# Patient Record
Sex: FEMALE | Race: WHITE | Employment: FULL TIME | URBAN - METROPOLITAN AREA
[De-identification: names, ages, dates, MRNs, and addresses within clinical notes are randomized per-mention and may not be internally consistent; named-entity substitution may affect disease eponyms.]

---

## 2023-09-21 ENCOUNTER — ANESTHESIA EVENT (OUTPATIENT)
Dept: LABOR AND DELIVERY | Age: 28
End: 2023-09-21

## 2023-09-22 ENCOUNTER — ANESTHESIA (OUTPATIENT)
Dept: LABOR AND DELIVERY | Age: 28
End: 2023-09-22

## 2023-09-22 ENCOUNTER — HOSPITAL ENCOUNTER (OUTPATIENT)
Age: 28
Discharge: HOME | End: 2023-09-22
Attending: OBSTETRICS & GYNECOLOGY | Admitting: OBSTETRICS & GYNECOLOGY

## 2023-09-22 VITALS
DIASTOLIC BLOOD PRESSURE: 54 MMHG | HEIGHT: 67 IN | RESPIRATION RATE: 16 BRPM | SYSTOLIC BLOOD PRESSURE: 97 MMHG | BODY MASS INDEX: 25.11 KG/M2 | HEART RATE: 58 BPM | WEIGHT: 160 LBS | OXYGEN SATURATION: 100 % | TEMPERATURE: 98 F

## 2023-09-22 LAB
ABO + RH BLD: NORMAL
BLD GP AB SCN SERPL QL: NORMAL
DEPRECATED RDW RBC AUTO: 13.1 % (ref 12.4–15.4)
HCT VFR BLD AUTO: 28.2 % (ref 36–48)
HCT VFR BLD AUTO: 33.6 % (ref 36–48)
HGB BLD-MCNC: 11.8 G/DL (ref 12–16)
HGB BLD-MCNC: 9.6 G/DL (ref 12–16)
MCH RBC QN AUTO: 31.6 PG (ref 26–34)
MCHC RBC AUTO-ENTMCNC: 35 G/DL (ref 31–36)
MCV RBC AUTO: 90.2 FL (ref 80–100)
PLATELET # BLD AUTO: 320 K/UL (ref 135–450)
PMV BLD AUTO: 8.4 FL (ref 5–10.5)
RBC # BLD AUTO: 3.73 M/UL (ref 4–5.2)
WBC # BLD AUTO: 6.2 K/UL (ref 4–11)

## 2023-09-22 PROCEDURE — 6360000002 HC RX W HCPCS: Performed by: OBSTETRICS & GYNECOLOGY

## 2023-09-22 PROCEDURE — 85027 COMPLETE CBC AUTOMATED: CPT

## 2023-09-22 PROCEDURE — 96375 TX/PRO/DX INJ NEW DRUG ADDON: CPT

## 2023-09-22 PROCEDURE — 3700000000 HC ANESTHESIA ATTENDED CARE: Performed by: OBSTETRICS & GYNECOLOGY

## 2023-09-22 PROCEDURE — 3600000002 HC SURGERY LEVEL 2 BASE: Performed by: OBSTETRICS & GYNECOLOGY

## 2023-09-22 PROCEDURE — 2709999900 HC NON-CHARGEABLE SUPPLY: Performed by: OBSTETRICS & GYNECOLOGY

## 2023-09-22 PROCEDURE — 2580000003 HC RX 258: Performed by: NURSE ANESTHETIST, CERTIFIED REGISTERED

## 2023-09-22 PROCEDURE — 2500000003 HC RX 250 WO HCPCS

## 2023-09-22 PROCEDURE — 86850 RBC ANTIBODY SCREEN: CPT

## 2023-09-22 PROCEDURE — 2500000003 HC RX 250 WO HCPCS: Performed by: NURSE ANESTHETIST, CERTIFIED REGISTERED

## 2023-09-22 PROCEDURE — 96360 HYDRATION IV INFUSION INIT: CPT

## 2023-09-22 PROCEDURE — 86901 BLOOD TYPING SEROLOGIC RH(D): CPT

## 2023-09-22 PROCEDURE — 88305 TISSUE EXAM BY PATHOLOGIST: CPT

## 2023-09-22 PROCEDURE — 86900 BLOOD TYPING SEROLOGIC ABO: CPT

## 2023-09-22 PROCEDURE — 85014 HEMATOCRIT: CPT

## 2023-09-22 PROCEDURE — 2580000003 HC RX 258: Performed by: OBSTETRICS & GYNECOLOGY

## 2023-09-22 PROCEDURE — 3700000001 HC ADD 15 MINUTES (ANESTHESIA): Performed by: OBSTETRICS & GYNECOLOGY

## 2023-09-22 PROCEDURE — 7100000000 HC PACU RECOVERY - FIRST 15 MIN: Performed by: OBSTETRICS & GYNECOLOGY

## 2023-09-22 PROCEDURE — 7100000001 HC PACU RECOVERY - ADDTL 15 MIN: Performed by: OBSTETRICS & GYNECOLOGY

## 2023-09-22 PROCEDURE — 3600000012 HC SURGERY LEVEL 2 ADDTL 15MIN: Performed by: OBSTETRICS & GYNECOLOGY

## 2023-09-22 PROCEDURE — 6360000002 HC RX W HCPCS

## 2023-09-22 PROCEDURE — 96374 THER/PROPH/DIAG INJ IV PUSH: CPT

## 2023-09-22 PROCEDURE — 2500000003 HC RX 250 WO HCPCS: Performed by: OBSTETRICS & GYNECOLOGY

## 2023-09-22 PROCEDURE — 6360000002 HC RX W HCPCS: Performed by: NURSE ANESTHETIST, CERTIFIED REGISTERED

## 2023-09-22 PROCEDURE — 96361 HYDRATE IV INFUSION ADD-ON: CPT

## 2023-09-22 PROCEDURE — 51701 INSERT BLADDER CATHETER: CPT

## 2023-09-22 PROCEDURE — 85018 HEMOGLOBIN: CPT

## 2023-09-22 RX ORDER — FENTANYL CITRATE 50 UG/ML
INJECTION, SOLUTION INTRAMUSCULAR; INTRAVENOUS PRN
Status: DISCONTINUED | OUTPATIENT
Start: 2023-09-22 | End: 2023-09-22 | Stop reason: SDUPTHER

## 2023-09-22 RX ORDER — FAMOTIDINE 10 MG/ML
INJECTION, SOLUTION INTRAVENOUS
Status: COMPLETED
Start: 2023-09-22 | End: 2023-09-22

## 2023-09-22 RX ORDER — SODIUM CHLORIDE, SODIUM LACTATE, POTASSIUM CHLORIDE, CALCIUM CHLORIDE 600; 310; 30; 20 MG/100ML; MG/100ML; MG/100ML; MG/100ML
INJECTION, SOLUTION INTRAVENOUS CONTINUOUS
Status: DISCONTINUED | OUTPATIENT
Start: 2023-09-22 | End: 2023-09-22 | Stop reason: HOSPADM

## 2023-09-22 RX ORDER — DEXAMETHASONE SODIUM PHOSPHATE 4 MG/ML
INJECTION, SOLUTION INTRA-ARTICULAR; INTRALESIONAL; INTRAMUSCULAR; INTRAVENOUS; SOFT TISSUE PRN
Status: DISCONTINUED | OUTPATIENT
Start: 2023-09-22 | End: 2023-09-22 | Stop reason: SDUPTHER

## 2023-09-22 RX ORDER — METOCLOPRAMIDE HYDROCHLORIDE 5 MG/ML
INJECTION INTRAMUSCULAR; INTRAVENOUS
Status: COMPLETED
Start: 2023-09-22 | End: 2023-09-22

## 2023-09-22 RX ORDER — METOCLOPRAMIDE HYDROCHLORIDE 5 MG/ML
10 INJECTION INTRAMUSCULAR; INTRAVENOUS ONCE
Status: COMPLETED | OUTPATIENT
Start: 2023-09-22 | End: 2023-09-22

## 2023-09-22 RX ORDER — METHYLERGONOVINE MALEATE 0.2 MG/ML
200 INJECTION INTRAVENOUS ONCE
Status: COMPLETED | OUTPATIENT
Start: 2023-09-22 | End: 2023-09-22

## 2023-09-22 RX ORDER — PROPOFOL 10 MG/ML
INJECTION, EMULSION INTRAVENOUS PRN
Status: DISCONTINUED | OUTPATIENT
Start: 2023-09-22 | End: 2023-09-22 | Stop reason: SDUPTHER

## 2023-09-22 RX ORDER — KETOROLAC TROMETHAMINE 30 MG/ML
INJECTION, SOLUTION INTRAMUSCULAR; INTRAVENOUS PRN
Status: DISCONTINUED | OUTPATIENT
Start: 2023-09-22 | End: 2023-09-22 | Stop reason: SDUPTHER

## 2023-09-22 RX ORDER — SODIUM CHLORIDE, SODIUM LACTATE, POTASSIUM CHLORIDE, CALCIUM CHLORIDE 600; 310; 30; 20 MG/100ML; MG/100ML; MG/100ML; MG/100ML
INJECTION, SOLUTION INTRAVENOUS CONTINUOUS PRN
Status: DISCONTINUED | OUTPATIENT
Start: 2023-09-22 | End: 2023-09-22 | Stop reason: SDUPTHER

## 2023-09-22 RX ORDER — ONDANSETRON 2 MG/ML
INJECTION INTRAMUSCULAR; INTRAVENOUS PRN
Status: DISCONTINUED | OUTPATIENT
Start: 2023-09-22 | End: 2023-09-22 | Stop reason: SDUPTHER

## 2023-09-22 RX ORDER — MIDAZOLAM HYDROCHLORIDE 1 MG/ML
INJECTION INTRAMUSCULAR; INTRAVENOUS PRN
Status: DISCONTINUED | OUTPATIENT
Start: 2023-09-22 | End: 2023-09-22 | Stop reason: SDUPTHER

## 2023-09-22 RX ORDER — LIDOCAINE HYDROCHLORIDE 20 MG/ML
INJECTION, SOLUTION INFILTRATION; PERINEURAL PRN
Status: DISCONTINUED | OUTPATIENT
Start: 2023-09-22 | End: 2023-09-22 | Stop reason: SDUPTHER

## 2023-09-22 RX ADMIN — FENTANYL CITRATE 50 MCG: 50 INJECTION INTRAMUSCULAR; INTRAVENOUS at 11:15

## 2023-09-22 RX ADMIN — PROPOFOL 150 MG: 10 INJECTION, EMULSION INTRAVENOUS at 11:15

## 2023-09-22 RX ADMIN — DOXYCYCLINE 200 MG: 100 INJECTION, POWDER, LYOPHILIZED, FOR SOLUTION INTRAVENOUS at 11:08

## 2023-09-22 RX ADMIN — SODIUM CHLORIDE, POTASSIUM CHLORIDE, SODIUM LACTATE AND CALCIUM CHLORIDE: 600; 310; 30; 20 INJECTION, SOLUTION INTRAVENOUS at 09:32

## 2023-09-22 RX ADMIN — FAMOTIDINE 20 MG: 10 INJECTION, SOLUTION INTRAVENOUS at 10:27

## 2023-09-22 RX ADMIN — METHYLERGONOVINE MALEATE 200 MCG: 0.2 INJECTION, SOLUTION INTRAMUSCULAR; INTRAVENOUS at 11:43

## 2023-09-22 RX ADMIN — KETOROLAC TROMETHAMINE 30 MG: 30 INJECTION, SOLUTION INTRAMUSCULAR; INTRAVENOUS at 11:19

## 2023-09-22 RX ADMIN — SODIUM CHLORIDE, SODIUM LACTATE, POTASSIUM CHLORIDE, AND CALCIUM CHLORIDE: .6; .31; .03; .02 INJECTION, SOLUTION INTRAVENOUS at 11:45

## 2023-09-22 RX ADMIN — LIDOCAINE HYDROCHLORIDE 100 MG: 20 INJECTION, SOLUTION INFILTRATION; PERINEURAL at 11:15

## 2023-09-22 RX ADMIN — MIDAZOLAM 1 MG: 1 INJECTION INTRAMUSCULAR; INTRAVENOUS at 11:08

## 2023-09-22 RX ADMIN — ONDANSETRON 4 MG: 2 INJECTION INTRAMUSCULAR; INTRAVENOUS at 11:19

## 2023-09-22 RX ADMIN — SODIUM CHLORIDE, SODIUM LACTATE, POTASSIUM CHLORIDE, AND CALCIUM CHLORIDE: .6; .31; .03; .02 INJECTION, SOLUTION INTRAVENOUS at 11:08

## 2023-09-22 RX ADMIN — METOCLOPRAMIDE HYDROCHLORIDE 10 MG: 5 INJECTION INTRAMUSCULAR; INTRAVENOUS at 10:27

## 2023-09-22 RX ADMIN — FENTANYL CITRATE 50 MCG: 50 INJECTION INTRAMUSCULAR; INTRAVENOUS at 11:08

## 2023-09-22 RX ADMIN — MIDAZOLAM 1 MG: 1 INJECTION INTRAMUSCULAR; INTRAVENOUS at 11:15

## 2023-09-22 RX ADMIN — DEXAMETHASONE SODIUM PHOSPHATE 8 MG: 4 INJECTION, SOLUTION INTRAMUSCULAR; INTRAVENOUS at 11:19

## 2023-09-22 NOTE — H&P
Department of Obstetrics and Gynecology   Obstetrics History and Physical        CHIEF COMPLAINT:  MAB    HISTORY OF PRESENT ILLNESS:    Jesica Galvan  is a 34 y.o.  female at 12w5d presents with a chief complaint as above and is being admitted for surgical management of MAB. PAST OB HISTORY:  OB History          1    Para        Term                AB        Living             SAB        IAB        Ectopic        Molar        Multiple        Live Births                  Past Medical History:    No past medical history on file.   Past Surgical History:        Procedure Laterality Date    WISDOM TOOTH EXTRACTION Bilateral      Allergies:  Adhesive tape  Social History:    Social History     Socioeconomic History    Marital status:      Spouse name: Not on file    Number of children: Not on file    Years of education: Not on file    Highest education level: Not on file   Occupational History    Not on file   Tobacco Use    Smoking status: Former     Types: Cigarettes     Quit date:      Years since quittin.7    Smokeless tobacco: Never   Vaping Use    Vaping Use: Never used   Substance and Sexual Activity    Alcohol use: Yes     Comment: socially    Drug use: Never    Sexual activity: Yes     Partners: Male   Other Topics Concern    Not on file   Social History Narrative    Not on file     Social Determinants of Health     Financial Resource Strain: Not on file   Food Insecurity: Not on file   Transportation Needs: Not on file   Physical Activity: Not on file   Stress: Not on file   Social Connections: Not on file   Intimate Partner Violence: Not on file   Housing Stability: Not on file     Family History:       Problem Relation Age of Onset    No Known Problems Mother     Unknown Father     No Known Problems Sister     No Known Problems Sister     No Known Problems Brother      Medications Prior to Admission:  Medications Prior to Admission: Prenatal MV-Min-Fe Fum-FA-DHA

## 2023-09-22 NOTE — FLOWSHEET NOTE
IV d/c'd for discharge. Discharge instructions given and reviewed. All questions answered. Discharged via wheelchair to waiting vehicle. Home with  in stable condition.

## 2023-09-22 NOTE — FLOWSHEET NOTE
Dr. Luis Carranza aware of H&H results, okay to give crackers and drink to patient. Soda and saltines provided.

## 2023-09-22 NOTE — OP NOTE
OPERATIVE REPORT    Date of surgery: 23   Pre-operative Diagnosis: Missed AB  Post-operative Diagnosis: the same  Procedure: Dilatation and Curretage  Anesthesia: General  Surgeon: Dr. Kyara Shook  Assistant: DAISY Pineda  Findings: 10wk sized anteverted uterus with large amount of products of conception  Complications: none  Specimens: Products of conception  Urine Output: 100ml mL  Estimated blood loss: 1300 mL    Indications: Patient is Jose Juan Hudson  is a 34 y.o.  female found to have [address] @ 11w5d measuring 10wks on US. The risks of the procedure were reviewed with the patient in details . They included but were not limited to risk of uterine perforation, that might require additional procedure, risk of bleeding and blood transfusion which carries risk for HIV or hepatitis, allergic reaction, risk of infection, risk of adhesive disease or scar formation in the uterus with subsequent infertility, risk of blood clot/embolism, risk of anesthesia, cardiac arrest, and remote risk of maternal death. Patient accepted the risks and elected to proceed with procedure. Description of Procedure: The patient was taken to the Operating Room where anesthesia was found to be adequate. Exam under anesthesia revealed anteverted uterus enlarged to 10 weeks. The patient was then placed in the dorsal lithotomy position and prepped and draped in the usual sterile fashion. A red rubber catheter was used to drain the urine from the bladder. Time out was performed, identifying correct patients name, date of birth, and type of the procedure. The cervix and distal vagina were visualized. The cervix was dilated on exam approximately to 1 cm. Tenaculum was used to grasp the anterior lip of the cervix. Suction currett 11 mm size was passed several times gently, removing Large amount of tissue. Polyp forceps were also used to remove a large piece of tissue during the procedure.  Following that sharp mike was

## 2023-09-22 NOTE — FLOWSHEET NOTE
Patient admitted to triage B for scheduled D&C. She states she is 10 weeks of gestation. Her  is at her bedside. Procedure consent, disposition form, and anesthesia consent forms discussed, patient given time to read and discuss with . All questions answered. Consents signed and placed on chart. IV placed, labs drawn and sent. Oriented to room, plan of care, and call light.

## 2023-09-22 NOTE — DISCHARGE INSTRUCTIONS
Follow up with your [facility] Provider as directed      At this time you may feel overwhelmed. Please know that there are resources available to help you through these difficult days. For a referral to a support group or to answer other questions you may call the Bereavement Nurse, Raven Lynch [phone] or  the [facility] Care Department at [phone]. General Discharge Instructions      DIET  Eat a well balanced diet focusing on foods high in fiber and protein. Drink plenty of fluids especially water. To avoid constipation you may take a mild stool softener as recommended by your doctor or midwife. ACTIVITY  Gradually increase your activity. Resume exercise regimen only after advised by your doctor or midwife. Avoid lifting anything heavier than a gallon of milk for SIX weeks. Avoid driving for two weeks or if taking Narcotics. Rise slowly from a lying to sitting and then a standing position. Climb stairs one at a time. NO SEXUAL Activity for 4-6 weeks or until advised by your doctor; nothing in vagina: intercourse, tampons, or douching. You may feel tired or have a lack of energy. You may continue your prenatal vitamin to replenish nutrients post delivery. EMOTIONS  You may feel rodriguez, sad & teary. Contact your OB provider if you feel you may be showing signs of postpartum depression. BLEEDING  Vaginal bleeding will decrease in amount over the next few weeks. You will notice that as your activity increases, your flow may increase. This is your body's way of telling you, you need take things easier and rest more often. Call your OB provider if you are saturating more than one maxi pad in an hour & resting does not help, if your bleeding has a foul odor or you are passing clots larger than a lemon. BREAST CARE  Take medications as recommended by your doctor or midwife for pain  If you develop a warm, red, tender area on your breast or develop a fever contact your OB provider.    You

## 2023-09-22 NOTE — FLOWSHEET NOTE
Myself, [address], [address], and Angela morales CRNA have attempted to draw blood for the STAT H&H but have been unsuccessful. Called phlebotomy and she will come draw it for us.

## 2023-09-22 NOTE — ANESTHESIA POSTPROCEDURE EVALUATION
Department of Anesthesiology  Postprocedure Note    Patient: Sourav January  MRN: [id number]  YOB: 1995  Date of evaluation: 9/22/2023      Procedure Summary     Date: 09/22/23 Room / Location: [facility] OR [address] [place] [address]    Anesthesia Start: 1108 Anesthesia Stop: 1159    Procedure: DILATATION AND CURETTAGE SUCTION (Vagina ) Diagnosis:       S/P D&C (status post dilation and curettage)      (S/P D&C (status post dilation and curettage) [S96.755])    Surgeons: Elidia Alberto MD Responsible Provider: Michael Campbell MD    Anesthesia Type: general ASA Status: 2          Anesthesia Type: General     Olamide Phase I: Olamide Score: 10    Olamide Phase II:        Anesthesia Post Evaluation    Patient location during evaluation: PACU  Patient participation: complete - patient participated  Level of consciousness: responsive to verbal stimuli and responsive to physical stimuli  Pain score: 2  Airway patency: patent  Nausea & Vomiting: no vomiting and no nausea  Complications: no  Cardiovascular status: hemodynamically stable  Respiratory status: spontaneous ventilation and acceptable  Hydration status: stable  Pain management: satisfactory to patient

## 2023-09-22 NOTE — FLOWSHEET NOTE
Patient has tolerated 8oz PO fluids and crackers with no n/v. Up to bathroom with contact guard assist, steady gait noted, able to void easily. She is dressing with her husbands help.

## 2024-04-23 LAB
C. TRACHOMATIS, EXTERNAL RESULT: NORMAL
N. GONORRHOEAE, EXTERNAL RESULT: NORMAL

## 2024-05-20 LAB
ABO, EXTERNAL RESULT: NORMAL
HEP B, EXTERNAL RESULT: NORMAL
HEPATITIS C ANTIBODY, EXTERNAL RESULT: NORMAL
HIV, EXTERNAL RESULT: NONREACTIVE
RH FACTOR, EXTERNAL RESULT: NORMAL
RUBELLA TITER, EXTERNAL RESULT: NORMAL

## 2024-08-20 LAB — RPR, EXTERNAL RESULT: NON REACTIVE

## 2024-09-29 ENCOUNTER — HOSPITAL ENCOUNTER (OUTPATIENT)
Age: 29
Discharge: HOME | End: 2024-09-29
Attending: OBSTETRICS & GYNECOLOGY | Admitting: OBSTETRICS & GYNECOLOGY
Payer: COMMERCIAL

## 2024-09-29 VITALS
DIASTOLIC BLOOD PRESSURE: 68 MMHG | RESPIRATION RATE: 18 BRPM | OXYGEN SATURATION: 97 % | HEART RATE: 86 BPM | WEIGHT: 180 LBS | TEMPERATURE: 98.3 F | HEIGHT: 67 IN | SYSTOLIC BLOOD PRESSURE: 117 MMHG | BODY MASS INDEX: 28.25 KG/M2

## 2024-09-29 PROBLEM — O46.90 VAGINAL BLEEDING DURING PREGNANCY: Status: ACTIVE | Noted: 2024-09-29

## 2024-09-29 PROCEDURE — 99202 OFFICE O/P NEW SF 15 MIN: CPT

## 2024-09-29 NOTE — FLOWSHEET NOTE
Discharge instructions reviewed with pt by LEWIS Moralez RN.  Pt verbalizes understanding.  Discharged home, ambulatory and undelivered.

## 2024-09-29 NOTE — FLOWSHEET NOTE
Pt presents to triage with c/o sudden onset vaginal bleeding and lower abdominal cramping after intercourse this AM at 0515.  Pt reports \"mostly full panty liner\" after 45 minutes and denies cramping/jairon at present.  Uterus palpates soft.  Pt verbalizes normal fetal movement.  Dr. Pichardo requesting SSE by Dr. Serna with update after.  POC discussed with pt and all questions answered.  Dr. Serna called with update on pt status.  MD to evaluate at bedside.

## 2024-09-29 NOTE — DISCHARGE INSTRUCTIONS
movement, persistent low back pain or cramping, bleeding from vaginal area, difficulty urinating, pain with urination, difficulty breathing, new calf pain, persistent headache, or vision change        Fetal Movement Chart    A daily diary of your baby's movements provides useful information.  Please bee down anytime the baby moves during at least one convenient hour each day.  Pick an hour when the baby is usually active.  It is also important that you have a regular meal within two hours. Lay on your left side, in this position the circulation to the baby is improved, and count the number of movements.  If the baby moves less than 5-6 times in an hour or 10 times in 2 hours, or you are concerned about you or your baby call your doctor or midwife.        Date Time Counts Total Date Time Counts Total

## 2024-09-29 NOTE — FLOWSHEET NOTE
Dr. Serna to pt bedside.  Assessment completed and SSE performed.  Pt tolerated well.  No active bleeding noted and cervix closed/thick/high with SVE.  FHR tracing viewed and POC discussed including recommendation for discharge home.  All pt questions answered.  Dr. Pichardo called with update on pt status.  Orders confirmed for discharge home at this time.

## 2024-09-29 NOTE — PROGRESS NOTES
House OB    30 yo W with IUP at 31.6 weeks pt of Dr. Pichardo. Presents to L&D this AM after SIC 5 AM today caused onset of vaginal spotting and some ctxs. Reports BPNC up til now. Next OB appt tomorrow. No fever or chills. No n/v. Reports great fetal movement    AfVss  WF in NAD, pleasant mood. A&Ox3.  Lungs: CTA  Heart: RRR w/o m/g/r  Abd: soft. Nontender. Uterus nontender. No HSM. No hernia, No CVAT  Ext: no c/c/e  Spec exam: cervix closed/thick/hi minimal/trace blood tinged d/c. No bleeding from os  Vtx on VE  -150s with good variability for gest. Age    A/P  IUP at 31.6 weeks   No si/o active bleeding after SIC   Pelvic rest instructions until cleared at next OB visit   `Kick counts and bleeding precautions reviewed   RN will notify attending physician fur further instructions.

## 2024-10-31 LAB — GBS, EXTERNAL RESULT: NEGATIVE

## 2024-11-20 ENCOUNTER — HOSPITAL ENCOUNTER (INPATIENT)
Age: 29
LOS: 1 days | Discharge: HOME | End: 2024-11-21
Attending: OBSTETRICS & GYNECOLOGY | Admitting: OBSTETRICS & GYNECOLOGY
Payer: COMMERCIAL

## 2024-11-20 LAB
ABO + RH BLD: NORMAL
AMPHETAMINES UR QL SCN>1000 NG/ML: ABNORMAL
BARBITURATES UR QL SCN>200 NG/ML: ABNORMAL
BENZODIAZ UR QL SCN>200 NG/ML: ABNORMAL
BLD GP AB SCN SERPL QL: NORMAL
BUPRENORPHINE+NOR UR QL SCN: ABNORMAL
CANNABINOIDS UR QL SCN>50 NG/ML: POSITIVE
COCAINE UR QL SCN: ABNORMAL
DEPRECATED RDW RBC AUTO: 12.6 % (ref 12.4–15.4)
DRUG SCREEN COMMENT UR-IMP: ABNORMAL
FENTANYL SCREEN, URINE: ABNORMAL
HCT VFR BLD AUTO: 37.6 % (ref 36–48)
HGB BLD-MCNC: 13 G/DL (ref 12–16)
MCH RBC QN AUTO: 31.7 PG (ref 26–34)
MCHC RBC AUTO-ENTMCNC: 34.6 G/DL (ref 31–36)
MCV RBC AUTO: 91.8 FL (ref 80–100)
METHADONE UR QL SCN>300 NG/ML: ABNORMAL
OPIATES UR QL SCN>300 NG/ML: ABNORMAL
OXYCODONE UR QL SCN: ABNORMAL
PCP UR QL SCN>25 NG/ML: ABNORMAL
PH UR STRIP: 8 [PH]
PLATELET # BLD AUTO: 355 K/UL (ref 135–450)
PMV BLD AUTO: 8.4 FL (ref 5–10.5)
RBC # BLD AUTO: 4.1 M/UL (ref 4–5.2)
REAGIN+T PALLIDUM IGG+IGM SERPL-IMP: NORMAL
WBC # BLD AUTO: 13.7 K/UL (ref 4–11)

## 2024-11-20 PROCEDURE — 86850 RBC ANTIBODY SCREEN: CPT

## 2024-11-20 PROCEDURE — 86900 BLOOD TYPING SEROLOGIC ABO: CPT

## 2024-11-20 PROCEDURE — 86780 TREPONEMA PALLIDUM: CPT

## 2024-11-20 PROCEDURE — 85027 COMPLETE CBC AUTOMATED: CPT

## 2024-11-20 PROCEDURE — 86901 BLOOD TYPING SEROLOGIC RH(D): CPT

## 2024-11-20 PROCEDURE — 80307 DRUG TEST PRSMV CHEM ANLYZR: CPT

## 2024-11-20 PROCEDURE — 7200000001 HC VAGINAL DELIVERY

## 2024-11-20 PROCEDURE — 6370000000 HC RX 637 (ALT 250 FOR IP): Performed by: OBSTETRICS & GYNECOLOGY

## 2024-11-20 PROCEDURE — 59025 FETAL NON-STRESS TEST: CPT

## 2024-11-20 PROCEDURE — 10907ZC DRAINAGE OF AMNIOTIC FLUID, THERAPEUTIC FROM PRODUCTS OF CONCEPTION, VIA NATURAL OR ARTIFICIAL OPENING: ICD-10-PCS | Performed by: OBSTETRICS & GYNECOLOGY

## 2024-11-20 PROCEDURE — 36415 COLL VENOUS BLD VENIPUNCTURE: CPT

## 2024-11-20 PROCEDURE — 88307 TISSUE EXAM BY PATHOLOGIST: CPT

## 2024-11-20 PROCEDURE — 1220000000 HC SEMI PRIVATE OB R&B

## 2024-11-20 PROCEDURE — 6360000002 HC RX W HCPCS: Performed by: OBSTETRICS & GYNECOLOGY

## 2024-11-20 RX ORDER — DOCUSATE SODIUM 100 MG/1
100 CAPSULE, LIQUID FILLED ORAL 2 TIMES DAILY
Status: DISCONTINUED | OUTPATIENT
Start: 2024-11-20 | End: 2024-11-21 | Stop reason: HOSPADM

## 2024-11-20 RX ORDER — METHYLERGONOVINE MALEATE 0.2 MG/ML
200 INJECTION INTRAVENOUS PRN
Status: DISCONTINUED | OUTPATIENT
Start: 2024-11-20 | End: 2024-11-21 | Stop reason: HOSPADM

## 2024-11-20 RX ORDER — SODIUM CHLORIDE 0.9 % (FLUSH) 0.9 %
5-40 SYRINGE (ML) INJECTION EVERY 12 HOURS SCHEDULED
Status: DISCONTINUED | OUTPATIENT
Start: 2024-11-20 | End: 2024-11-20 | Stop reason: HOSPADM

## 2024-11-20 RX ORDER — SODIUM CHLORIDE 0.9 % (FLUSH) 0.9 %
5-40 SYRINGE (ML) INJECTION PRN
Status: DISCONTINUED | OUTPATIENT
Start: 2024-11-20 | End: 2024-11-20 | Stop reason: HOSPADM

## 2024-11-20 RX ORDER — HYDROCORTISONE 25 MG/G
CREAM TOPICAL 3 TIMES DAILY PRN
Status: DISCONTINUED | OUTPATIENT
Start: 2024-11-20 | End: 2024-11-21 | Stop reason: HOSPADM

## 2024-11-20 RX ORDER — OXYCODONE HYDROCHLORIDE 10 MG/1
10 TABLET ORAL EVERY 4 HOURS PRN
Status: DISCONTINUED | OUTPATIENT
Start: 2024-11-20 | End: 2024-11-21 | Stop reason: HOSPADM

## 2024-11-20 RX ORDER — ONDANSETRON 2 MG/ML
4 INJECTION INTRAMUSCULAR; INTRAVENOUS EVERY 6 HOURS PRN
Status: DISCONTINUED | OUTPATIENT
Start: 2024-11-20 | End: 2024-11-21 | Stop reason: HOSPADM

## 2024-11-20 RX ORDER — SODIUM CHLORIDE 0.9 % (FLUSH) 0.9 %
5-40 SYRINGE (ML) INJECTION PRN
Status: DISCONTINUED | OUTPATIENT
Start: 2024-11-20 | End: 2024-11-21 | Stop reason: HOSPADM

## 2024-11-20 RX ORDER — TRANEXAMIC ACID 10 MG/ML
1000 INJECTION, SOLUTION INTRAVENOUS
Status: ACTIVE | OUTPATIENT
Start: 2024-11-20 | End: 2024-11-21

## 2024-11-20 RX ORDER — DOCUSATE SODIUM 100 MG/1
100 CAPSULE, LIQUID FILLED ORAL 2 TIMES DAILY
Status: DISCONTINUED | OUTPATIENT
Start: 2024-11-20 | End: 2024-11-20 | Stop reason: SDUPTHER

## 2024-11-20 RX ORDER — MISOPROSTOL 200 UG/1
800 TABLET ORAL ONCE
Status: COMPLETED | OUTPATIENT
Start: 2024-11-20 | End: 2024-11-20

## 2024-11-20 RX ORDER — BUTORPHANOL TARTRATE 1 MG/ML
1 INJECTION, SOLUTION INTRAMUSCULAR; INTRAVENOUS
Status: DISCONTINUED | OUTPATIENT
Start: 2024-11-20 | End: 2024-11-20 | Stop reason: HOSPADM

## 2024-11-20 RX ORDER — SODIUM CHLORIDE 9 MG/ML
INJECTION, SOLUTION INTRAVENOUS PRN
Status: DISCONTINUED | OUTPATIENT
Start: 2024-11-20 | End: 2024-11-21 | Stop reason: HOSPADM

## 2024-11-20 RX ORDER — IBUPROFEN 800 MG/1
800 TABLET, FILM COATED ORAL EVERY 8 HOURS SCHEDULED
Status: DISCONTINUED | OUTPATIENT
Start: 2024-11-20 | End: 2024-11-20

## 2024-11-20 RX ORDER — METHYLERGONOVINE MALEATE 0.2 MG/ML
200 INJECTION INTRAVENOUS ONCE
Status: DISCONTINUED | OUTPATIENT
Start: 2024-11-20 | End: 2024-11-21 | Stop reason: HOSPADM

## 2024-11-20 RX ORDER — ONDANSETRON 2 MG/ML
4 INJECTION INTRAMUSCULAR; INTRAVENOUS EVERY 6 HOURS PRN
Status: DISCONTINUED | OUTPATIENT
Start: 2024-11-20 | End: 2024-11-20

## 2024-11-20 RX ORDER — ONDANSETRON 4 MG/1
4 TABLET, ORALLY DISINTEGRATING ORAL EVERY 6 HOURS PRN
Status: DISCONTINUED | OUTPATIENT
Start: 2024-11-20 | End: 2024-11-20

## 2024-11-20 RX ORDER — IBUPROFEN 800 MG/1
800 TABLET, FILM COATED ORAL EVERY 8 HOURS SCHEDULED
Status: DISCONTINUED | OUTPATIENT
Start: 2024-11-20 | End: 2024-11-21 | Stop reason: HOSPADM

## 2024-11-20 RX ORDER — SODIUM CHLORIDE 9 MG/ML
25 INJECTION, SOLUTION INTRAVENOUS PRN
Status: DISCONTINUED | OUTPATIENT
Start: 2024-11-20 | End: 2024-11-20 | Stop reason: SDUPTHER

## 2024-11-20 RX ORDER — ACETAMINOPHEN 500 MG
1000 TABLET ORAL EVERY 8 HOURS SCHEDULED
Status: DISCONTINUED | OUTPATIENT
Start: 2024-11-20 | End: 2024-11-21 | Stop reason: HOSPADM

## 2024-11-20 RX ORDER — SODIUM CHLORIDE, SODIUM LACTATE, POTASSIUM CHLORIDE, CALCIUM CHLORIDE 600; 310; 30; 20 MG/100ML; MG/100ML; MG/100ML; MG/100ML
INJECTION, SOLUTION INTRAVENOUS CONTINUOUS
Status: DISCONTINUED | OUTPATIENT
Start: 2024-11-20 | End: 2024-11-20 | Stop reason: CLARIF

## 2024-11-20 RX ORDER — MISOPROSTOL 200 UG/1
400 TABLET ORAL PRN
Status: DISCONTINUED | OUTPATIENT
Start: 2024-11-20 | End: 2024-11-21 | Stop reason: HOSPADM

## 2024-11-20 RX ORDER — CARBOPROST TROMETHAMINE 250 UG/ML
250 INJECTION, SOLUTION INTRAMUSCULAR PRN
Status: DISCONTINUED | OUTPATIENT
Start: 2024-11-20 | End: 2024-11-21 | Stop reason: HOSPADM

## 2024-11-20 RX ORDER — OXYCODONE HYDROCHLORIDE 5 MG/1
5 TABLET ORAL EVERY 4 HOURS PRN
Status: DISCONTINUED | OUTPATIENT
Start: 2024-11-20 | End: 2024-11-21 | Stop reason: HOSPADM

## 2024-11-20 RX ORDER — ACETAMINOPHEN 325 MG/1
650 TABLET ORAL EVERY 4 HOURS PRN
Status: DISCONTINUED | OUTPATIENT
Start: 2024-11-20 | End: 2024-11-20 | Stop reason: HOSPADM

## 2024-11-20 RX ORDER — SODIUM CHLORIDE 0.9 % (FLUSH) 0.9 %
5-40 SYRINGE (ML) INJECTION EVERY 12 HOURS SCHEDULED
Status: DISCONTINUED | OUTPATIENT
Start: 2024-11-20 | End: 2024-11-21 | Stop reason: HOSPADM

## 2024-11-20 RX ORDER — LIDOCAINE HYDROCHLORIDE 10 MG/ML
30 INJECTION, SOLUTION EPIDURAL; INFILTRATION; INTRACAUDAL; PERINEURAL ONCE
Status: DISCONTINUED | OUTPATIENT
Start: 2024-11-20 | End: 2024-11-21 | Stop reason: HOSPADM

## 2024-11-20 RX ORDER — ONDANSETRON 4 MG/1
4 TABLET, ORALLY DISINTEGRATING ORAL EVERY 6 HOURS PRN
Status: DISCONTINUED | OUTPATIENT
Start: 2024-11-20 | End: 2024-11-21 | Stop reason: HOSPADM

## 2024-11-20 RX ORDER — TERBUTALINE SULFATE 1 MG/ML
0.25 INJECTION, SOLUTION SUBCUTANEOUS
Status: DISCONTINUED | OUTPATIENT
Start: 2024-11-20 | End: 2024-11-20 | Stop reason: HOSPADM

## 2024-11-20 RX ADMIN — MISOPROSTOL 800 MCG: 200 TABLET ORAL at 17:21

## 2024-11-20 RX ADMIN — IBUPROFEN 800 MG: 800 TABLET, FILM COATED ORAL at 21:03

## 2024-11-20 RX ADMIN — METHYLERGONOVINE MALEATE 200 MCG: 0.2 INJECTION, SOLUTION INTRAMUSCULAR; INTRAVENOUS at 17:20

## 2024-11-20 RX ADMIN — Medication 10 UNITS: at 17:14

## 2024-11-20 RX ADMIN — Medication 87.3 MILLI-UNITS/MIN: at 17:14

## 2024-11-20 RX ADMIN — BENZOCAINE AND LEVOMENTHOL: 200; 5 SPRAY TOPICAL at 18:27

## 2024-11-20 ASSESSMENT — PAIN SCALES - GENERAL: PAINLEVEL_OUTOF10: 2

## 2024-11-20 NOTE — FLOWSHEET NOTE
Delivery viable infant female. RN remained at bedside throughout pushing, EFM continuously assessed. Infant with cry as soon as stimulated and dried on mother's chest. Infant bulb suctioned, infant skin turning pink and continues to cry during stimulation. terminal meconium, delayed cord clamping, vitals taken WNL, Placed skin to skin with mother; Lottie JEFFREY  Baby nurse monitoring continuously. Warm blankets placed over mother and infant. Will continue with normal  care.

## 2024-11-20 NOTE — H&P
Department of Obstetrics and Gynecology   Obstetrics History and Physical        CHIEF COMPLAINT:  contractions    HISTORY OF PRESENT ILLNESS:    Erna Valdivia  is a 29 y.o.  female at 39w2d presents with a chief complaint as above and is being admitted for active phase labor    Estimated Due Date: Estimated Date of Delivery: 24    PRENATAL CARE: Complicated by: none    PAST OB HISTORY:  OB History          2    Para   0    Term   0       0    AB   1    Living   0         SAB   1    IAB   0    Ectopic   0    Molar   0    Multiple   0    Live Births   0              Past Medical History:        Diagnosis Date    Anemia     mild     Past Surgical History:        Procedure Laterality Date    DILATION AND CURETTAGE OF UTERUS N/A 2023    DILATION AND CURETTAGE OF UTERUS N/A 2023    DILATATION AND CURETTAGE SUCTION performed by Sweta Crowe MD at [facility] L&D OR    WISDOM TOOTH EXTRACTION Bilateral      Allergies:  Adhesive tape  Social History:    Social History     Socioeconomic History    Marital status:      Spouse name: Not on file    Number of children: Not on file    Years of education: Not on file    Highest education level: Not on file   Occupational History    Not on file   Tobacco Use    Smoking status: Former     Current packs/day: 0.00     Types: Cigarettes     Quit date:      Years since quittin.8    Smokeless tobacco: Never   Vaping Use    Vaping status: Never Used   Substance and Sexual Activity    Alcohol use: Not Currently    Drug use: Never    Sexual activity: Yes     Partners: Male   Other Topics Concern    Not on file   Social History Narrative    Not on file     Social Determinants of Health     Financial Resource Strain: Not on file   Food Insecurity: Not on file   Transportation Needs: Not on file   Physical Activity: Not on file   Stress: Not on file   Social Connections: Not on file   Intimate Partner Violence: Not on file   Housing

## 2024-11-21 VITALS
TEMPERATURE: 97.7 F | HEART RATE: 66 BPM | RESPIRATION RATE: 16 BRPM | DIASTOLIC BLOOD PRESSURE: 65 MMHG | OXYGEN SATURATION: 98 % | SYSTOLIC BLOOD PRESSURE: 115 MMHG | WEIGHT: 197 LBS | BODY MASS INDEX: 30.85 KG/M2

## 2024-11-21 PROCEDURE — 6370000000 HC RX 637 (ALT 250 FOR IP): Performed by: OBSTETRICS & GYNECOLOGY

## 2024-11-21 PROCEDURE — 7200000001 HC VAGINAL DELIVERY

## 2024-11-21 RX ORDER — SODIUM CHLORIDE, SODIUM LACTATE, POTASSIUM CHLORIDE, AND CALCIUM CHLORIDE .6; .31; .03; .02 G/100ML; G/100ML; G/100ML; G/100ML
500 INJECTION, SOLUTION INTRAVENOUS PRN
Status: DISCONTINUED | OUTPATIENT
Start: 2024-11-21 | End: 2024-11-21 | Stop reason: HOSPADM

## 2024-11-21 RX ADMIN — ACETAMINOPHEN 1000 MG: 500 TABLET ORAL at 00:10

## 2024-11-21 RX ADMIN — DOCUSATE SODIUM 100 MG: 100 CAPSULE, LIQUID FILLED ORAL at 08:00

## 2024-11-21 RX ADMIN — IBUPROFEN 800 MG: 800 TABLET, FILM COATED ORAL at 04:28

## 2024-11-21 RX ADMIN — ACETAMINOPHEN 1000 MG: 500 TABLET ORAL at 08:00

## 2024-11-21 RX ADMIN — IBUPROFEN 800 MG: 800 TABLET, FILM COATED ORAL at 11:43

## 2024-11-21 ASSESSMENT — PAIN DESCRIPTION - ONSET: ONSET: GRADUAL

## 2024-11-21 ASSESSMENT — PAIN DESCRIPTION - DESCRIPTORS
DESCRIPTORS: DISCOMFORT
DESCRIPTORS: DISCOMFORT

## 2024-11-21 ASSESSMENT — PAIN DESCRIPTION - FREQUENCY: FREQUENCY: INTERMITTENT

## 2024-11-21 ASSESSMENT — PAIN - FUNCTIONAL ASSESSMENT
PAIN_FUNCTIONAL_ASSESSMENT: ACTIVITIES ARE NOT PREVENTED
PAIN_FUNCTIONAL_ASSESSMENT: ACTIVITIES ARE NOT PREVENTED

## 2024-11-21 ASSESSMENT — PAIN SCALES - GENERAL
PAINLEVEL_OUTOF10: 0
PAINLEVEL_OUTOF10: 1
PAINLEVEL_OUTOF10: 0
PAINLEVEL_OUTOF10: 1

## 2024-11-21 ASSESSMENT — PAIN DESCRIPTION - PAIN TYPE: TYPE: ACUTE PAIN

## 2024-11-21 ASSESSMENT — PAIN DESCRIPTION - LOCATION: LOCATION: GENERALIZED

## 2024-11-21 NOTE — PROGRESS NOTES
Department of Obstetrics and Gynecology  Vaginal Delivery Postpartum Rounds    SUBJECTIVE:  Pain is controlled with Tylenol or non-steroidal anti-inflammatory drugs. Her lochia is normal.    OBJECTIVE:  Vital Signs: /72   Pulse 64   Temp 97.8 °F (36.6 °C) (Oral)   Resp 14   Wt 89.4 kg (197 lb)   LMP 2024   SpO2 98%   Breastfeeding Unknown   BMI 30.85 kg/m²   Appearance/Psychiatric: awake, alert, cooperative, no apparent distress, appears stated age  Constitutional: The patient is well nourished.  Cardiovascular: She does have trace edema.  Respiratory: Respiratory effort is normal.  Gastrointestinal: Soft, non tender, uterine fundus is firm below umbilicus  Extremities: nontender to palpation  Perineum: intact     LABS / IMAGING:    Lab Results   Component Value Date/Time    WBC 13.7 2024 02:00 PM    RBC 4.10 2024 02:00 PM    HGB 13.0 2024 02:00 PM    HCT 37.6 2024 02:00 PM    MCV 91.8 2024 02:00 PM    MCH 31.7 2024 02:00 PM    MCHC 34.6 2024 02:00 PM    RDW 12.6 2024 02:00 PM     2024 02:00 PM    MPV 8.4 2024 02:00 PM     ASSESSMENT:    Postpartum Day 1 s/p     PLAN:   1. Continue routine postpartum care   2. Female infant, breast feeding.  3. Discharge home on Postpartum Day 1  4. Return to office in 6 weeks   5. Postpartum instructions reviewed and all patient's Questions answered    Electronically signed by Sweta Crowe MD on 2024 at 9:17 AM

## 2024-11-21 NOTE — FLOWSHEET NOTE
Patient sat up to side of bed.  No complaint of dizziness or lightheadedness.  Patient stood up at bedside with contact guard assist x1, no complaint of dizziness.  Patient ambulated to bathroom with contact guard assist x1, steady gait noted.  Patient able to void easily.  Educated patient on kassie care and use of kassie meds, she was able to demonstrate understanding. Shower supplies provided, IV covered, patient showered and dressed independently.  Ambulated to wheelchair.  Infant placed in mothers and both transferred to  room 2257.  Ice water provided.  Call light within reach.  FOB at bedside, visitors x2 to room at this time.  No further needs at this time.    Report given to RACHELE Blanco.  She is assuming care at this time.

## 2024-11-21 NOTE — FLOWSHEET NOTE
Patient is actively pushing on right side. RN remains in continuous attendance at the bedside.  Assessment and evaluation of fetal heart rate ongoing via EFM.

## 2024-11-21 NOTE — L&D DELIVERY SUMMARY NOTE
[facility]          [address] [place] [address]                         LABOR AND DELIVERY NOTE      PATIENT NAME: HOLLY MACK              : 1995  MED REC NO: [id number]                      ROOM: [address] [place] [address]  ACCOUNT NO: [id number]                       ADMIT DATE: 2024  PROVIDER: Sarah Flesch Sabin, MD      DATE OF PROCEDURE: 2024    SURGEON:  Sarah Flesch Sabin, MD    The patient is a G2, P0, at 39 weeks who presented in spontaneous labor.  The patient desired natural childbirth.  Artificial rupture of membranes was performed.  The patient progressed to complete and pushed to have a spontaneous vaginal delivery of a baby girl.  Apgars were 8 and 9.  The placenta delivered spontaneously and was sent to pathology for meconium stained fluid.  The patient immediately had brisk bleeding afterwards and there was difficulty getting the Pitocin started.  Therefore, the uterus was massaged for the atony on the abdomen as well as in the uterus. Methergine was given and Cytotec was placed rectally as well. The Pitocin ultimately was able to be started and running. The quantitative blood loss  was 200 mL.  Vaginal sweep ensured no retained sponges and counts were correct.          SARAH FLESCH SABIN, MD      D:  2024 17:42:12     T:  2024 01:06:46     SFS/AQS  Job #:  [id number]     Doc#:  [id number]

## 2024-11-21 NOTE — DISCHARGE INSTRUCTIONS
Department of Obstetrics and Gynecology  Vaginal Delivery Postpartum Discharge instructions    You will need a postpartum visit in the clinic 6 weeks after your delivery.    Please call office [phone] to schedule an appointment:      Please call the office or the OB/GYN on-call if after-hours for any of the followin) Fever - a temperature greater than 100.4  2) Uncontrolled pain  3) Uncontrolled bleeding (soaking more than 1 pad in an hour)  4) Foul-smelling discharge from the vagina    Do not place anything in the vagina - this includes tampons, douches or having sex - until after your 6 week postpartum visit to prevent infection.

## 2024-11-21 NOTE — LACTATION NOTE
This note was copied from a baby's chart.  Lactation Consult Note    Data: Consult received and appreciated. LC reviewed chart and spoke with bedside RN.    Maternal History: h/o SAB with D&C, h/o anxiety/depression, no meds recently     OB/Delivery Risks for Lactation: NCB, PPH, Methergine and Cytotec; CARMEN, first baby    Breast pump for home use: has one from insurance     Action: LC to room. Introduced self and lactation services. Mother agreeable to consult at this time.  Mother resting in bed. Infant sleeping, swaddled in mother's arms, showing no hunger cues at this time. Mother states breastfeeding is going okay so far, states infant latching well but falls asleep easily. Mother states last feeding was about 3 hours ago. LC suggested and mother agreed to attempt to feed at this time.  Infant placed skin to skin with mom. Taught cross cradle hold, using boppy for support. Encouraged hand expressing colostrum to coax infant to latch. After about 10 minutes, she remained sleepy so encouraged keeping her in skin contact and trying again when cues are shown.     LC reviewed Care Plan for First 24 Hours of Life and beyond.  Discussed recognizing hunger cues and offering the breast when cues are shown. Encouraged breastfeeding on demand and attempting/offering at least every 3 hours. Encouraged unlimited skin to skin contact with infant and reviewed benefits including better temperature, heart rate, respiration, blood pressure, and blood sugar regulation. Also increased bonding and milk supply associated with skin to skin contact.     Reviewed milk supply/production process and when to expect milk volumes to increase and milk to transition in. Reviewed engorgement management and comfort techniques. Encouraged continued feeding on demand, watching for hunger cues, taking Motrin as prescribed, and applying ice/cold packs for comfort. Reviewed what to watch for and when to notify provider.    Reviewed feeding positions

## 2024-11-21 NOTE — FLOWSHEET NOTE
Postpartum and infant care teaching completed. Patient verbalized understanding of all teaching points and denied any further questions at this time.     Patient plans to follow-up with OB Provider in 6 weeks  as instructed.     Mother's ID band and one of baby's ID bands verified then removed and taped to footprint sheet. Sheet signed by patient and witnessed by RN.     Patient discharged in stable condition accompanied by family. Discharged in wheelchair, holding baby in car seat.

## 2024-11-21 NOTE — PLAN OF CARE
Problem: Pain  Goal: Verbalizes/displays adequate comfort level or baseline comfort level  Outcome: Completed  Flowsheets (Taken 11/21/2024 0800)  Verbalizes/displays adequate comfort level or baseline comfort level:   Encourage patient to monitor pain and request assistance   Assess pain using appropriate pain scale   Administer analgesics based on type and severity of pain and evaluate response   Implement non-pharmacological measures as appropriate and evaluate response   Consider cultural and social influences on pain and pain management   Notify Licensed Independent Practitioner if interventions unsuccessful or patient reports new pain     Problem: Safety - Adult  Goal: Free from fall injury  Outcome: Completed

## 2024-11-21 NOTE — DISCHARGE SUMMARY
Department of Obstetrics and Gynecology  Postpartum Discharge Summary      Admit Date: 2024    Admit Diagnosis: Normal labor [O80, Z37.9]    Discharge Date: 24.  Any delay in discharge from ordered D/C date due to  factors.    Discharge Diagnoses: spontaneous vaginal delivery       Medication List        CONTINUE taking these medications      PRENATAL 1 PO              Service: Obstetrics    Consults: none    Significant Diagnostic Studies: none    Postpartum complications: none     Condition at Discharge: good    Hospital Course: uncomplicated    Discharge Instructions:     Activity: as tolerated    Diet: regular diet    Instructions: No intercourse and nothing in the vagina for 6 weeks. Do not drive while using pain medications. Keep any wounds clean and dry    Discharge to: Home    Disposition / Follow up: Return to office in 6 weeks    Home Health Nurse visit within 24-48 h if qualifies    [place] Data:  Apgars:  Information for the patient's :  Paola Valdivia [[id number]]   APGAR One: 8  Information for the patient's :  Paola Valdivia [[id number]]   APGAR Five: 9  Birth Weight:  Information for the patient's :  Paola Valdivia [[id number]]   Birth Weight: 3.25 kg (7 lb 2.6 oz)  Home with mother    Electronically signed by Sweta Crowe MD on 2024 at 5:00 PM

## 2024-11-21 NOTE — FLOWSHEET NOTE
11/20/24 1400   Fetal Heart Rate   Mode External US   Baseline Rate 130 bpm   Baseline Classification Normal   Variability 6-25 BPM   Pattern Accelerations   Patient Feels Fetal Movement Yes   Interventions RN at Bedside   OB Bladder Status Non-distended   OB Bladder Intervention Voiding with Relief   Multiple birth? No   Fetal Monitoring Strip   Fetal Monitoring Mode Wireless   FMS Reviewed? Yes   FMS Reviewed By? Heraclio Geronimo RN C-EFM   Uterine Activity   UA Mode Palpation;[place]   Contraction Frequency 2-4   Contraction Duration 40-60   Contraction Intensity Moderate   Resting Tone Palpated Soft

## 2024-11-23 NOTE — FLOWSHEET NOTE
11/23/2024 at 1044..Pt here for infant outpt  visit and informed this nurse that she reviewed her lab work from her admission and states she noticed her urine result positive for THC, pt is adamant she has not used THC and wants this result removed from her chart. Follow up with administration.

## 2025-02-08 NOTE — PLAN OF CARE
[facility]- Outpatient Rehabilitation and Therapy [address], [place] [address] office: [phone] fax: [phone]     Physical Therapy Initial Evaluation Certification      Dear Martina Taveras MD,    We had the pleasure of evaluating the following patient for physical therapy services at [facility].  A summary of our findings can be found in the initial assessment below.  This includes our plan of care.  If you have any questions or concerns regarding these findings, please do not hesitate to contact me at the office phone number listed above.  Thank you for the referral.     Physician Signature:_______________________________Date:__________________  By signing above (or electronic signature), therapist’s plan is approved by physician       Physical Therapy: TREATMENT/PROGRESS NOTE   Patient: Erna Valdivia (29 y.o. female)   Examination Date: 2025   :  1995 MRN: [id number]   Visit #: 1   Insurance Allowable Auth Needed   30 []Yes    [x]No    Insurance: Payor: UMR / Plan: UMR / Product Type: *No Product type* /   Insurance ID: [id number] - (Commercial)  $40 CP/no auth  Secondary Insurance (if applicable):    Treatment Diagnosis:       ICD-10-CM    1. Stress incontinence of urine  N39.3       2. Pelvic floor dysfunction  M62.89       3. Mixed stress and urge urinary incontinence  N39.46       4. 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   Plan of care signed (Y/N):     Date of Patient follow up with Physician:      Progress Report/POC: EVAL today  POC update due: (10 visits /OR AUTH LIMITS, whichever is less)  3/14/2025                                             Precautions/ Contra-indications:           Latex allergy:  NO  Pacemaker:    NO  Contraindications for

## 2025-02-12 ENCOUNTER — HOSPITAL ENCOUNTER (OUTPATIENT)
Dept: PHYSICAL THERAPY | Age: 30
Setting detail: THERAPIES SERIES
Discharge: HOME | End: 2025-02-12
Payer: COMMERCIAL

## 2025-02-12 DIAGNOSIS — N94.10 DYSPAREUNIA IN FEMALE: ICD-10-CM

## 2025-02-12 DIAGNOSIS — M62.89 PELVIC FLOOR DYSFUNCTION: ICD-10-CM

## 2025-02-12 DIAGNOSIS — N39.46 MIXED STRESS AND URGE URINARY INCONTINENCE: ICD-10-CM

## 2025-02-12 DIAGNOSIS — N39.3 STRESS INCONTINENCE OF URINE: Primary | ICD-10-CM

## 2025-02-12 PROCEDURE — 97161 PT EVAL LOW COMPLEX 20 MIN: CPT | Performed by: PHYSICAL THERAPIST

## 2025-02-12 PROCEDURE — 97530 THERAPEUTIC ACTIVITIES: CPT | Performed by: PHYSICAL THERAPIST

## 2025-02-12 PROCEDURE — 97112 NEUROMUSCULAR REEDUCATION: CPT | Performed by: PHYSICAL THERAPIST

## 2025-02-14 NOTE — FLOWSHEET NOTE
[facility]- Outpatient Rehabilitation and Therapy [address], [address], [place] [address] office: [phone] fax: [phone]     Physical Therapy        Physical Therapy: TREATMENT/PROGRESS NOTE   Patient: Erna Valdivia (29 y.o. female)   Examination Date: 2025   :  1995 MRN: [id number]   Visit #: 2   Insurance Allowable Auth Needed   30 []Yes    [x]No    Insurance: Payor: UMR / Plan: UMR / Product Type: *No Product type* /   Insurance ID: [id number] - (Commercial)  $40 CP/no auth  Secondary Insurance (if applicable):    Treatment Diagnosis:       ICD-10-CM    1. Stress incontinence of urine  N39.3       2. Pelvic floor dysfunction  M62.89       3. Mixed stress and urge urinary incontinence  N39.46       4. 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   Plan of care signed (Y/N): Y  2025 via fax from Dr Taveras    Date of Patient follow up with Physician:      Progress Report/POC: NO  POC update due: (10 visits /OR AUTH LIMITS, whichever is less)  3/14/2025                                             Precautions/ Contra-indications:           Latex allergy:  NO  Pacemaker:    NO  Contraindications for Manipulation: None  Date of Surgery: D&C 2023  Other: vaginal delivery 2024 - still lactating    Red Flags:  None    Suicide Screening:   The patient did not verbalize a primary behavioral concern, suicidal ideation, suicidal intent, or demonstrate suicidal behaviors.    Preferred Language for Healthcare:   [x] English       [] other:    SUBJECTIVE EXAMINATION     2025 - Patient stated complaint/comments: weak pelvic floor and bladder issues post partum, reports bulge (bladder/urethral)  in vaginal area about 4 weeks which patient reports was determined to be her bladder, discomfort with base

## 2025-02-19 ENCOUNTER — HOSPITAL ENCOUNTER (OUTPATIENT)
Dept: PHYSICAL THERAPY | Age: 30
Setting detail: THERAPIES SERIES
Discharge: HOME | End: 2025-02-19
Payer: COMMERCIAL

## 2025-02-19 DIAGNOSIS — N39.46 MIXED STRESS AND URGE URINARY INCONTINENCE: ICD-10-CM

## 2025-02-19 DIAGNOSIS — M62.89 PELVIC FLOOR DYSFUNCTION: ICD-10-CM

## 2025-02-19 DIAGNOSIS — N39.3 STRESS INCONTINENCE OF URINE: Primary | ICD-10-CM

## 2025-02-19 DIAGNOSIS — N94.10 DYSPAREUNIA IN FEMALE: ICD-10-CM

## 2025-02-19 PROCEDURE — 97110 THERAPEUTIC EXERCISES: CPT | Performed by: PHYSICAL THERAPIST

## 2025-02-19 PROCEDURE — 97140 MANUAL THERAPY 1/> REGIONS: CPT | Performed by: PHYSICAL THERAPIST

## 2025-02-19 PROCEDURE — 97530 THERAPEUTIC ACTIVITIES: CPT | Performed by: PHYSICAL THERAPIST

## 2025-02-19 PROCEDURE — 97112 NEUROMUSCULAR REEDUCATION: CPT | Performed by: PHYSICAL THERAPIST

## 2025-02-19 NOTE — FLOWSHEET NOTE
[facility]- Outpatient Rehabilitation and Therapy [address], [address], [place] [address] office: [phone] fax: [phone]     Physical Therapy        Physical Therapy: TREATMENT/PROGRESS NOTE   Patient: Erna Valdivia (30 y.o. female)   Examination Date: 2025   :  1995 MRN: [id number]   Visit #: 3   Insurance Allowable Auth Needed   30 []Yes    [x]No    Insurance: Payor: UMR / Plan: UMR / Product Type: *No Product type* /   Insurance ID: [id number] - (Commercial)  $40 CP/no auth  Secondary Insurance (if applicable):    Treatment Diagnosis:       ICD-10-CM    1. Stress incontinence of urine  N39.3       2. Pelvic floor dysfunction  M62.89       3. Mixed stress and urge urinary incontinence  N39.46       4. 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   Plan of care signed (Y/N): Y  2025 via fax from Dr Taveras    Date of Patient follow up with Physician:      Progress Report/POC: NO  POC update due: (10 visits /OR AUTH LIMITS, whichever is less)  3/12/2025   Re-certification due (every 90 days): 2025                                            Precautions/ Contra-indications:           Latex allergy:  NO  Pacemaker:    NO  Contraindications for Manipulation: None  Date of Surgery: D&C 2023  Other: vaginal delivery 2024 - still lactating    Red Flags:  None    Suicide Screening:   The patient did not verbalize a primary behavioral concern, suicidal ideation, suicidal intent, or demonstrate suicidal behaviors.    Preferred Language for Healthcare:   [x] English       [] other:    SUBJECTIVE EXAMINATION     2025 - Patient stated complaint/comments: weak pelvic floor and bladder issues post partum, reports bulge (bladder/urethral)  in vaginal area about 4 weeks which patient reports was

## 2025-02-26 ENCOUNTER — HOSPITAL ENCOUNTER (OUTPATIENT)
Dept: PHYSICAL THERAPY | Age: 30
Setting detail: THERAPIES SERIES
Discharge: HOME | End: 2025-02-26
Payer: COMMERCIAL

## 2025-02-26 DIAGNOSIS — N39.46 MIXED STRESS AND URGE URINARY INCONTINENCE: ICD-10-CM

## 2025-02-26 DIAGNOSIS — N39.3 STRESS INCONTINENCE OF URINE: Primary | ICD-10-CM

## 2025-02-26 DIAGNOSIS — N94.10 DYSPAREUNIA IN FEMALE: ICD-10-CM

## 2025-02-26 DIAGNOSIS — M62.89 PELVIC FLOOR DYSFUNCTION: ICD-10-CM

## 2025-02-26 PROCEDURE — 97110 THERAPEUTIC EXERCISES: CPT | Performed by: PHYSICAL THERAPIST

## 2025-02-26 PROCEDURE — 97140 MANUAL THERAPY 1/> REGIONS: CPT | Performed by: PHYSICAL THERAPIST

## 2025-02-26 PROCEDURE — 97112 NEUROMUSCULAR REEDUCATION: CPT | Performed by: PHYSICAL THERAPIST

## 2025-02-26 PROCEDURE — 97530 THERAPEUTIC ACTIVITIES: CPT | Performed by: PHYSICAL THERAPIST

## 2025-02-26 NOTE — FLOWSHEET NOTE
intraabdominal pressures and therefore more balance control of pressure on PF muscles especially during functional activities.     Issued and reviewed booklet on body mechanics, emphasizing need for TA contraction prior to and during lifting activities and exhalation on exertion with all functional activities and exercises.     Cautioned about possibly increasing tension in PF muscles with core activities.  Emphasized need to stretch after core activities due to possible increased tension/tightness in PF muscles.      Reviewed need to use lower abs for support/lift with overflow to PF muscles - nohemi when performing core activities.     Ongoing education/techniques/strategies on need for improved bladder control.       Differences in approaches with various PF PTs - explained recommending more manual therapy to directly work on tissues/muscles that are tight and lending to pain and incorporating specific strategies to assist in allowing her muscles to stretch and relax more efficiently and effectively.         Addressed specific concerns of patient as they arose during session.     Patient appeared to have better understanding of current bladder/bowel issues and improved outlook on situation by end of PF PT therapy session.       Modalities:    No modalities applied this session    Education/Home Exercise Program: HEP discussed and performed, see exercise grid      ASSESSMENT   Erna Valdivia is a 30 y.o. female presenting today to Outpatient PT with signs and symptoms consistent with dyspareunia likely related to postpartum decreased estrogen with ongoing lactation and urinary stress and urge incontinence nohemi during active lifestyle with noted prolapse and underlying PF muscle dysfunction.   Pt. presents with the functional impairments and activity limitations listed below and would benefit from Outpatient PT to address the below impairments as well as improve pain, and restore function.      Patient very pleased

## 2025-03-05 ENCOUNTER — HOSPITAL ENCOUNTER (OUTPATIENT)
Dept: PHYSICAL THERAPY | Age: 30
Setting detail: THERAPIES SERIES
Discharge: HOME | End: 2025-03-05
Payer: COMMERCIAL

## 2025-03-05 DIAGNOSIS — M62.89 PELVIC FLOOR DYSFUNCTION: ICD-10-CM

## 2025-03-05 DIAGNOSIS — N94.10 DYSPAREUNIA IN FEMALE: ICD-10-CM

## 2025-03-05 DIAGNOSIS — N39.3 STRESS INCONTINENCE OF URINE: Primary | ICD-10-CM

## 2025-03-05 DIAGNOSIS — N39.46 MIXED STRESS AND URGE URINARY INCONTINENCE: ICD-10-CM

## 2025-03-05 PROCEDURE — 97112 NEUROMUSCULAR REEDUCATION: CPT | Performed by: PHYSICAL THERAPIST

## 2025-03-05 PROCEDURE — 97530 THERAPEUTIC ACTIVITIES: CPT | Performed by: PHYSICAL THERAPIST

## 2025-03-05 NOTE — PLAN OF CARE
pushing, pulling, jumping.)  Direct, one on one contact, billed in 15-minute increments.  (38031) MANUAL THERAPY -  Manual therapy techniques, 1 or more regions, each 15 minutes (Mobilization/manipulation, manual lymphatic drainage, manual traction) for the purpose of modulating pain, promoting relaxation,  increasing ROM, reducing/eliminating soft tissue swelling/inflammation/restriction, improving soft tissue extensibility and allowing for proper ROM for normal function with self care, mobility, lifting and ambulation      GOALS     Patient stated goal:  be able to dance and exercise without wetting my pants, limit bladder leakage, decrease or manage prolapse symptoms, limit discomfort during sex   3/11/2025 - progressing in all areas - see subjective  [x] Progressing: [] Met: [] Not Met: [] Adjusted      Therapist goals for Patient:     Short Term Goals: To be achieved in: 4-5 sessions    1. Patient will have a decrease in pelvic pain nohemi during vaginal penetrative intercourse and/or urinary urgency, frequency and incontinence to indicate improvement in pelvic floor strength/motor control and relaxation, muscle coordination, improved intraabdominal/intrapelvic pressures, and/or bladder retraining.  [] Progressing: [x] Met: [] Not Met: [] Adjusted  2. Perform HEP for pelvic floor and core muscle groups with minimal direction from therapist as she progresses to become more independent managing her intraabdominal/intrapelvic pressure and PF and surrounding core muscle weakness and/or tightness.  [] Progressing: [x] Met: [] Not Met: [] Adjusted  3. Report using 1-2 behavioral modification strategies to reduce urinary/bowel complaints through dietary and mechanical changes, with focus on full emptying of bladder with each urination and using optimal positioning and deep breathing for relaxation of posterior PF muscles during defacation, reducing need to strain.  [] Progressing: [x] Met: [] Not Met: [] Adjusted    Long

## 2025-03-11 ENCOUNTER — HOSPITAL ENCOUNTER (OUTPATIENT)
Dept: PHYSICAL THERAPY | Age: 30
Setting detail: THERAPIES SERIES
Discharge: HOME | End: 2025-03-11
Payer: COMMERCIAL

## 2025-03-11 DIAGNOSIS — N39.46 MIXED STRESS AND URGE URINARY INCONTINENCE: ICD-10-CM

## 2025-03-11 DIAGNOSIS — N39.3 STRESS INCONTINENCE OF URINE: Primary | ICD-10-CM

## 2025-03-11 DIAGNOSIS — N94.10 DYSPAREUNIA IN FEMALE: ICD-10-CM

## 2025-03-11 DIAGNOSIS — M62.89 PELVIC FLOOR DYSFUNCTION: ICD-10-CM

## 2025-03-11 PROCEDURE — 97140 MANUAL THERAPY 1/> REGIONS: CPT | Performed by: PHYSICAL THERAPIST

## 2025-03-11 PROCEDURE — 97112 NEUROMUSCULAR REEDUCATION: CPT | Performed by: PHYSICAL THERAPIST

## 2025-03-11 PROCEDURE — 97530 THERAPEUTIC ACTIVITIES: CPT | Performed by: PHYSICAL THERAPIST

## 2025-03-13 NOTE — FLOWSHEET NOTE
[facility] - Outpatient Rehabilitation and Therapy: [address] [place] [address] office: [phone] fax: [phone]       Physical Therapy        Physical Therapy: TREATMENT/PROGRESS NOTE   Patient: Erna Valdivia (30 y.o. female)   Examination Date: 2025   :  1995 MRN: [id number]   Visit #: 6   Insurance Allowable Auth Needed   30 []Yes    [x]No    Insurance: Payor: UMR / Plan: UMR / Product Type: *No Product type* /   Insurance ID: [id number] - (Commercial)  $40 CP/no auth  Secondary Insurance (if applicable):    Treatment Diagnosis:       ICD-10-CM    1. Stress incontinence of urine  N39.3       2. Pelvic floor dysfunction  M62.89       3. Mixed stress and urge urinary incontinence  N39.46       4. 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   Plan of care signed (Y/N): Y  2025 via fax from Dr Taveras    Date of Patient follow up with Physician: TBD - annual gyn exam     Progress Report/POC: NO  Progress Report update due: (10 visits /OR AUTH LIMITS, whichever is less)  2025   POC/Re-certification due (every 90 days): 2025                                            Precautions/ Contra-indications:           Latex allergy:  NO  Pacemaker:    NO  Contraindications for Manipulation: None  Date of Surgery: D&C 2023  Other: vaginal delivery 2024 - still lactating    Red Flags:  None    Suicide Screening:   The patient did not verbalize a primary behavioral concern, suicidal ideation, suicidal intent, or demonstrate suicidal behaviors.    Preferred Language for Healthcare:   [x] English       [] other:    SUBJECTIVE EXAMINATION     2025 - Patient stated complaint/comments: weak pelvic floor and bladder issues post partum, reports bulge (bladder/urethral)  in vaginal area about 4

## 2025-03-27 ENCOUNTER — HOSPITAL ENCOUNTER (OUTPATIENT)
Dept: PHYSICAL THERAPY | Age: 30
Setting detail: THERAPIES SERIES
Discharge: HOME | End: 2025-03-27
Payer: COMMERCIAL

## 2025-03-27 DIAGNOSIS — N39.3 STRESS INCONTINENCE OF URINE: Primary | ICD-10-CM

## 2025-03-27 DIAGNOSIS — N94.10 DYSPAREUNIA IN FEMALE: ICD-10-CM

## 2025-03-27 DIAGNOSIS — N39.46 MIXED STRESS AND URGE URINARY INCONTINENCE: ICD-10-CM

## 2025-03-27 DIAGNOSIS — M62.89 PELVIC FLOOR DYSFUNCTION: ICD-10-CM

## 2025-03-27 PROCEDURE — 97140 MANUAL THERAPY 1/> REGIONS: CPT | Performed by: PHYSICAL THERAPIST

## 2025-03-27 PROCEDURE — 97112 NEUROMUSCULAR REEDUCATION: CPT | Performed by: PHYSICAL THERAPIST

## 2025-03-27 PROCEDURE — 97530 THERAPEUTIC ACTIVITIES: CPT | Performed by: PHYSICAL THERAPIST

## 2025-03-27 NOTE — FLOWSHEET NOTE
[facility] - Outpatient Rehabilitation and Therapy: [address] [place] [address] office: [phone] fax: [phone]       Physical Therapy        Physical Therapy: TREATMENT/PROGRESS NOTE   Patient: Erna Valdivia (30 y.o. female)   Examination Date: 2025   :  1995 MRN: [id number]   Visit #: 7   Insurance Allowable Auth Needed   30 []Yes    [x]No    Insurance: Payor: UMR / Plan: UMR / Product Type: *No Product type* /   Insurance ID: [id number] - (Commercial)  $40 CP/no auth  Secondary Insurance (if applicable):    Treatment Diagnosis:       ICD-10-CM    1. Stress incontinence of urine  N39.3       2. Pelvic floor dysfunction  M62.89       3. Mixed stress and urge urinary incontinence  N39.46       4. 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   Plan of care signed (Y/N): Y  2025 via fax from Dr Taveras    Date of Patient follow up with Physician: TBD - annual gyn exam     Progress Report/POC: NO  Progress Report update due: (10 visits /OR AUTH LIMITS, whichever is less)  2025   POC/Re-certification due (every 90 days): 2025                                            Precautions/ Contra-indications:           Latex allergy:  NO  Pacemaker:    NO  Contraindications for Manipulation: None  Date of Surgery: D&C 2023  Other: vaginal delivery 2024 - still lactating    Red Flags:  None    Suicide Screening:   The patient did not verbalize a primary behavioral concern, suicidal ideation, suicidal intent, or demonstrate suicidal behaviors.    Preferred Language for Healthcare:   [x] English       [] other:    SUBJECTIVE EXAMINATION     2025 - Patient stated complaint/comments: weak pelvic floor and bladder issues post partum, reports bulge (bladder/urethral)  in vaginal area about 4

## 2025-04-03 ENCOUNTER — HOSPITAL ENCOUNTER (OUTPATIENT)
Dept: PHYSICAL THERAPY | Age: 30
Setting detail: THERAPIES SERIES
Discharge: HOME | End: 2025-04-03

## 2025-04-03 DIAGNOSIS — N94.10 DYSPAREUNIA IN FEMALE: ICD-10-CM

## 2025-04-03 DIAGNOSIS — N39.3 STRESS INCONTINENCE OF URINE: Primary | ICD-10-CM

## 2025-04-03 DIAGNOSIS — N39.46 MIXED STRESS AND URGE URINARY INCONTINENCE: ICD-10-CM

## 2025-04-03 DIAGNOSIS — M62.89 PELVIC FLOOR DYSFUNCTION: ICD-10-CM

## 2025-04-09 NOTE — PLAN OF CARE
[facility] - Outpatient Rehabilitation and Therapy: [address] [place] [address] office: [phone] fax: [phone]       Physical Therapy  Physical Therapy Treatment Note and Progress Note/Plan of Care           Dear Dr. Taveras,    We had the pleasure of treating the following patient for pelvic floor physical therapy services at [facility].  A summary of our findings can be found in the re-certification assessment below.  This includes our plan of care.  Please review the attached evaluation and/or summary of the patient's plan of care, and verify that you agree therapy should continue by signing the attached document and sending it back to our office.  If you have any questions or concerns regarding these findings, please do not hesitate to contact me at the office phone number listed above.      Thank you for the referral.     Physician Signature:_______________________________Date:__________________  By signing above (or electronic signature), therapist’s plan is approved by physician       Physical Therapy: TREATMENT/PROGRESS NOTE   Patient: Erna Valdivia (30 y.o. female)   Examination Date: 04/15/2025   :  1995 MRN: [id number]   Visit #: 7   Insurance Allowable Auth Needed   30 []Yes    [x]No    Insurance: Payor: UMR / Plan: UMR / Product Type: *No Product type* /   Insurance ID: [id number] - (Commercial)  $40 CP/no auth  Secondary Insurance (if applicable):    Treatment Diagnosis:       ICD-10-CM    1. Stress incontinence of urine  N39.3       2. Pelvic floor dysfunction  M62.89       3. Mixed stress and urge urinary incontinence  N39.46       4. 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   Plan of care signed (Y/N): Y  2025 via fax from Dr Taveras    Date of Patient

## 2025-04-15 ENCOUNTER — HOSPITAL ENCOUNTER (OUTPATIENT)
Dept: PHYSICAL THERAPY | Age: 30
Setting detail: THERAPIES SERIES
Discharge: HOME | End: 2025-04-15
Payer: COMMERCIAL

## 2025-04-15 DIAGNOSIS — N94.10 DYSPAREUNIA IN FEMALE: ICD-10-CM

## 2025-04-15 DIAGNOSIS — M62.89 PELVIC FLOOR DYSFUNCTION: ICD-10-CM

## 2025-04-15 DIAGNOSIS — N39.3 STRESS INCONTINENCE OF URINE: Primary | ICD-10-CM

## 2025-04-15 DIAGNOSIS — N39.46 MIXED STRESS AND URGE URINARY INCONTINENCE: ICD-10-CM

## 2025-04-15 PROCEDURE — 97110 THERAPEUTIC EXERCISES: CPT | Performed by: PHYSICAL THERAPIST

## 2025-04-15 PROCEDURE — 97530 THERAPEUTIC ACTIVITIES: CPT | Performed by: PHYSICAL THERAPIST

## 2025-04-15 PROCEDURE — 97112 NEUROMUSCULAR REEDUCATION: CPT | Performed by: PHYSICAL THERAPIST

## 2025-04-16 NOTE — FLOWSHEET NOTE
- continues with bulge/pressure in vaginal area, seems worse when assessed in standing   [x] Progressing: [] Met: [] Not Met: [] Adjusted   9. Perform HEP for pelvic floor and core muscle groups independently as she progresses to self-manage her pelvic pressure/pain and PF and surrounding core muscle weakness and/or tightness.  [x] Progressing: [] Met: [] Not Met: [] Adjusted       Overall Progression Towards Functional goals/ Treatment Progress Update:  [x] Patient is progressing as expected towards functional goals listed.    [x] Progression is slowed due to complexities/Impairments listed.  [x] Progression has been slowed due to co-morbidities.  [] Plan just implemented, too soon (<30days) to assess goals progression   [] Goals require adjustment due to lack of progress  [] Patient is not progressing as expected and requires additional follow up with physician  [] Other:     TREATMENT PLAN     Frequency/Duration: once every 1-2 weeks for additional ~2-3 months  for the following treatment interventions:      Interventions:  Therapeutic Exercise (59297) including: strength training, ROM, and functional mobility  Therapeutic Activities (46359) including: functional mobility training and education.  Neuromuscular Re-education (63321) activation and proprioception, including postural re-education.    Manual Therapy (22856) as indicated to include: Soft Tissue Mobilization, Trigger Point Release, and Myofascial Release  Patient education on postural re-education, activity modification, progression of HEP, and pelvic floor/bowel and bladder anatomy and function    Plan: Cont POC- Continue emphasis/focus on exercise progression, improving proper muscle recruitment and activation/motor control patterns, modulating pain, promoting relaxation, reduce/eliminate soft tissue swelling/inflammation/restriction, improving soft tissue extensibility, allowing for proper ROM, kinesthetic sense and proprioception, and improving

## 2025-04-24 ENCOUNTER — HOSPITAL ENCOUNTER (OUTPATIENT)
Dept: PHYSICAL THERAPY | Age: 30
Setting detail: THERAPIES SERIES
Discharge: HOME | End: 2025-04-24
Payer: COMMERCIAL

## 2025-04-24 DIAGNOSIS — M62.89 PELVIC FLOOR DYSFUNCTION: ICD-10-CM

## 2025-04-24 DIAGNOSIS — N39.3 STRESS INCONTINENCE OF URINE: Primary | ICD-10-CM

## 2025-04-24 DIAGNOSIS — N94.10 DYSPAREUNIA IN FEMALE: ICD-10-CM

## 2025-04-24 DIAGNOSIS — N39.46 MIXED STRESS AND URGE URINARY INCONTINENCE: ICD-10-CM

## 2025-04-24 PROCEDURE — 97112 NEUROMUSCULAR REEDUCATION: CPT | Performed by: PHYSICAL THERAPIST

## 2025-04-24 PROCEDURE — 97530 THERAPEUTIC ACTIVITIES: CPT | Performed by: PHYSICAL THERAPIST

## 2025-04-24 PROCEDURE — 97140 MANUAL THERAPY 1/> REGIONS: CPT | Performed by: PHYSICAL THERAPIST

## 2025-04-29 NOTE — FLOWSHEET NOTE
[facility] Outpatient [facility] and Therapy [address], [address], [place] [address] office: [phone] fax: [phone]          Physical Therapy        Physical Therapy: TREATMENT/PROGRESS NOTE   Patient: Erna Valdivia (30 y.o. female)   Examination Date: 2025   :  1995 MRN: [id number]   Visit #: 9   Insurance Allowable Auth Needed   30V []Yes    [x]No    Insurance: Payor: UMR / Plan: UMR / Product Type: *No Product type* /   Insurance ID: [id number] - (Commercial)  $40 CP/no auth  Secondary Insurance (if applicable):    Treatment Diagnosis:       ICD-10-CM    1. Stress incontinence of urine  N39.3       2. Pelvic floor dysfunction  M62.89       3. Mixed stress and urge urinary incontinence  N39.46       4. 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   Plan of care signed (Y/N): Y  2025 via fax from Dr Taveras    Sent Re-certification from 2025 via  Received signature 2025 via fax from Dr Taveras    Date of Patient follow up with Physician: TBD - annual gyn exam   - 2025 - patient plans to make an appointment to discuss birth control now that she has resumed menstruation, possible pessary fitting, and possible topical vaginal estrogen due to genitourinary symptoms with lactation      Progress Report/POC: NO  Progress Report update due: (10 visits /OR AUTH LIMITS, whichever is less)  5/15/2025   POC/Re-certification due (every 90 days): 7/15/2025                                            Precautions/ Contra-indications:           Latex allergy:  NO  Pacemaker:    NO  Contraindications for Manipulation: None  Date of Surgery: D&C 2023  Other: vaginal delivery 2024 - still lactating    Red Flags:  None    Suicide Screening:   The patient did not verbalize a primary behavioral

## 2025-05-08 ENCOUNTER — HOSPITAL ENCOUNTER (OUTPATIENT)
Dept: PHYSICAL THERAPY | Age: 30
Setting detail: THERAPIES SERIES
Discharge: HOME | End: 2025-05-08
Payer: COMMERCIAL

## 2025-05-08 DIAGNOSIS — N94.10 DYSPAREUNIA IN FEMALE: ICD-10-CM

## 2025-05-08 DIAGNOSIS — N39.46 MIXED STRESS AND URGE URINARY INCONTINENCE: ICD-10-CM

## 2025-05-08 DIAGNOSIS — M62.89 PELVIC FLOOR DYSFUNCTION: ICD-10-CM

## 2025-05-08 DIAGNOSIS — N39.3 STRESS INCONTINENCE OF URINE: Primary | ICD-10-CM

## 2025-05-08 PROCEDURE — 97112 NEUROMUSCULAR REEDUCATION: CPT | Performed by: PHYSICAL THERAPIST

## 2025-05-08 PROCEDURE — 97530 THERAPEUTIC ACTIVITIES: CPT | Performed by: PHYSICAL THERAPIST

## 2025-05-08 PROCEDURE — 97140 MANUAL THERAPY 1/> REGIONS: CPT | Performed by: PHYSICAL THERAPIST

## 2025-05-15 NOTE — PLAN OF CARE
[facility]- Outpatient Rehabilitation and Therapy [address], [address], [place] [address] office: [phone] fax: [phone]              Physical Therapy Discharge Summary    Dr. Taveras,    Please see discharge summary for pelvic floor physical therapy for Erna Valdivia.       Overall Response to Treatment:              [x]Patient is responding well to treatment and improvement is noted with regards  to goals              [x]Patient should continue to improve in reasonable time if they continue HEP                 Recommendation:               [x] Discharge to HEP. Follow up with PT or MD PRN.     Please see last progress note below for discharge status when last seen on 2025.      Thank you for the referral,  BRAYDON BRUNSON, PT #[id number]         Physical Therapy: TREATMENT/PROGRESS NOTE   Patient: Erna Valdivia (30 y.o. female)   Examination Date: 2025   :  1995 MRN: [id number]   Visit #: 10   Insurance Allowable Auth Needed   30V []Yes    [x]No    Insurance: Payor: UMR / Plan: UMR / Product Type: *No Product type* /   Insurance ID: [id number] - (Commercial)  $40 CP/no auth  Secondary Insurance (if applicable):    Treatment Diagnosis:       ICD-10-CM    1. Stress incontinence of urine  N39.3       2. Pelvic floor dysfunction  M62.89       3. Mixed stress and urge urinary incontinence  N39.46       4. 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   Plan of care signed (Y/N): Y  2025 via fax from Dr Taveras    Sent Re-certification from 2025 via  Received signature 2025 via fax from Dr Taveras    Date of Patient follow up with Physician: TBD - annual gyn exam   - 2025 - patient plans to make an appointment to discuss birth control now that she has resumed menstruation, possible pessary

## 2025-05-21 ENCOUNTER — HOSPITAL ENCOUNTER (OUTPATIENT)
Dept: PHYSICAL THERAPY | Age: 30
Setting detail: THERAPIES SERIES
Discharge: HOME | End: 2025-05-21
Payer: COMMERCIAL

## 2025-05-21 PROCEDURE — 97140 MANUAL THERAPY 1/> REGIONS: CPT | Performed by: PHYSICAL THERAPIST

## 2025-05-21 PROCEDURE — 97530 THERAPEUTIC ACTIVITIES: CPT | Performed by: PHYSICAL THERAPIST

## (undated) DEVICE — SPONGE LAP W18XL18IN WHT COT 4 PLY FLD STRUNG RADPQ DISP ST 2 PER PACK

## (undated) DEVICE — CATHETER,URETHRAL,VINYL,MALE,16",16 FR: Brand: MEDLINE

## (undated) DEVICE — COVER US PRB W13XL244CM SURGICAL INTRAOPERATIVE W RUBBERBAND

## (undated) DEVICE — SET COLL TBNG L6FT DIA3/8IN W/ INTEGR SWVL HNDL SLIP RNG M

## (undated) DEVICE — TRAP TISS DISP FOR COLL SYS BERK SAFETOUCH

## (undated) DEVICE — SOLUTION IV IRRIG WATER 500ML POUR BRL ST 2F7113

## (undated) DEVICE — TRAY SKIN PREP GELWITH 2 SPNG

## (undated) DEVICE — CURETTE SURG VAC 11 MM CRV RIGID PLAS

## (undated) DEVICE — PACK PROCEDURE SURG VAG DEL REV

## (undated) DEVICE — CANISTER, RIGID, 1200CC: Brand: MEDLINE INDUSTRIES, INC.

## (undated) DEVICE — PAD,NON-ADHERENT,3X8,STERILE,LF,1/PK: Brand: MEDLINE

## (undated) DEVICE — COVER LT HNDL BLU PLAS